# Patient Record
Sex: FEMALE | Race: WHITE | NOT HISPANIC OR LATINO | URBAN - METROPOLITAN AREA
[De-identification: names, ages, dates, MRNs, and addresses within clinical notes are randomized per-mention and may not be internally consistent; named-entity substitution may affect disease eponyms.]

---

## 2018-01-31 ENCOUNTER — IMPORTED ENCOUNTER (OUTPATIENT)
Dept: URBAN - METROPOLITAN AREA CLINIC 9 | Facility: CLINIC | Age: 53
End: 2018-01-31

## 2019-01-16 ENCOUNTER — IMPORTED ENCOUNTER (OUTPATIENT)
Dept: URBAN - METROPOLITAN AREA CLINIC 9 | Facility: CLINIC | Age: 54
End: 2019-01-16

## 2020-01-20 ENCOUNTER — IMPORTED ENCOUNTER (OUTPATIENT)
Dept: URBAN - METROPOLITAN AREA CLINIC 9 | Facility: CLINIC | Age: 55
End: 2020-01-20

## 2020-05-20 NOTE — PATIENT DISCUSSION
GLAUCOMA (SUSPECT) OU :  CD ASYMMETRY. THINNING NOTICED ON ON OCT OU TODAY. RETURN FOR IOP CHECK &amp; REPEAT SCAN IN 3 WEEKS.

## 2020-05-29 NOTE — PATIENT DISCUSSION
GLAUCOMA SUSPECT, OU :  HVF IS WITHIN ACCEPTABLE LIMITS. THICK PACHS. NO DROP THERAPY NEEDED AT THIS TIME.

## 2020-12-14 NOTE — PATIENT DISCUSSION
*Glaucoma Suspect Counseling: I have explained to the patient that they are at a slightly greater risk of glaucoma. Glaucoma potentially causes loss of peripheral vision due to damage to the optic nerve. I have explained that damage to the optic nerve from glaucoma is irreversible and that the available treatments for glaucoma are designed to prevent further damage if we determine glaucoma is present. I have explained the importance of regular follow-up with dilated eye exams to monitor for possible glaucoma.

## 2020-12-14 NOTE — PATIENT DISCUSSION
GLAUCOMA (SUSPECT) OU : CD ASYMMETRY. VERY THICK CORNEAS. DISCUSSED VF AND STABLE RNFL WITH PT. RETURN FOR FOLLOW UP AS SCHEDULED.

## 2021-01-21 ENCOUNTER — IMPORTED ENCOUNTER (OUTPATIENT)
Dept: URBAN - METROPOLITAN AREA CLINIC 9 | Facility: CLINIC | Age: 56
End: 2021-01-21

## 2021-01-21 PROBLEM — H35.3121: Noted: 2021-01-21

## 2021-01-21 PROBLEM — H04.123: Noted: 2021-01-21

## 2021-01-21 PROBLEM — H11.153: Noted: 2021-01-21

## 2021-01-21 PROBLEM — H10.45: Noted: 2021-01-21

## 2021-10-19 ASSESSMENT — KERATOMETRY
OS_K1POWER_DIOPTERS: 44.75
OS_AXISANGLE_DEGREES: 7
OD_K1POWER_DIOPTERS: 44.75
OD_AXISANGLE_DEGREES: 5
OD_K2POWER_DIOPTERS: 45
OD_AXISANGLE2_DEGREES: 95
OS_K1POWER_DIOPTERS: 44
OS_AXISANGLE2_DEGREES: 101
OD_K1POWER_DIOPTERS: 44.25
OS_K2POWER_DIOPTERS: 45.25
OS_AXISANGLE2_DEGREES: 97
OD_AXISANGLE_DEGREES: 180
OS_K2POWER_DIOPTERS: 45
OD_AXISANGLE2_DEGREES: 108
OD_K2POWER_DIOPTERS: 45.25
OS_AXISANGLE_DEGREES: 3
OD_K2POWER_DIOPTERS: 45
OD_K1POWER_DIOPTERS: 44.5
OS_AXISANGLE2_DEGREES: 93
OS_K1POWER_DIOPTERS: 44.5
OS_K2POWER_DIOPTERS: 45.25
OD_K1POWER_DIOPTERS: 44.75
OS_AXISANGLE_DEGREES: 11
OS_K1POWER_DIOPTERS: 44.75
OD_K2POWER_DIOPTERS: 45.25
OS_AXISANGLE_DEGREES: 178
OD_AXISANGLE_DEGREES: 18
OD_AXISANGLE_DEGREES: 8
OS_K2POWER_DIOPTERS: 45
OD_AXISANGLE2_DEGREES: 90
OD_AXISANGLE2_DEGREES: 98
OS_AXISANGLE2_DEGREES: 88

## 2021-10-19 ASSESSMENT — VISUAL ACUITY
OD_CC: 14.2
OS_CC: 20/25 +2 SN
OD_CC: 20/20 SN
OD_CC: 14.2
OS_CC: 14.2
OS_CC: 14.2
OS_CC: 20/20 SN
OS_CC: 20/20 SN
OS_CC: 14.0
OD_CC: 14.0
OD_CC: 20/20 - SN
OD_CC: 20/20 SN
OD_CC: 20/20 SN
OS_CC: 20/20 SN
OD_CC: 20/20 - SN
OS_CC: 14.0
OD_CC: 20/25 -2 SN
OS_CC: 20/20 SN
OD_CC: 14.0
OD_CC: 20/20 SN

## 2021-10-19 ASSESSMENT — TONOMETRY
OD_IOP_MMHG: 14
OS_IOP_MMHG: 12
OD_IOP_MMHG: 13
OD_IOP_MMHG: 13
OS_IOP_MMHG: 12
OS_IOP_MMHG: 13
OD_IOP_MMHG: 12
OS_IOP_MMHG: 14

## 2022-01-31 ENCOUNTER — ESTABLISHED PATIENT (OUTPATIENT)
Dept: URBAN - METROPOLITAN AREA CLINIC 4 | Facility: CLINIC | Age: 57
End: 2022-01-31

## 2022-01-31 DIAGNOSIS — H11.153: ICD-10-CM

## 2022-01-31 DIAGNOSIS — H35.3121: ICD-10-CM

## 2022-01-31 PROCEDURE — 92014 COMPRE OPH EXAM EST PT 1/>: CPT

## 2022-01-31 PROCEDURE — 92310A CONTACT LENS 50

## 2022-01-31 PROCEDURE — 92015 DETERMINE REFRACTIVE STATE: CPT

## 2022-01-31 PROCEDURE — 92134 CPTRZ OPH DX IMG PST SGM RTA: CPT

## 2022-01-31 ASSESSMENT — KERATOMETRY
OD_AXISANGLE_DEGREES: 014
OD_AXISANGLE2_DEGREES: 108
OS_K1POWER_DIOPTERS: 44.75
OS_AXISANGLE_DEGREES: 7
OS_AXISANGLE_DEGREES: 002
OS_K2POWER_DIOPTERS: 45.25
OD_K1POWER_DIOPTERS: 45.00
OD_K2POWER_DIOPTERS: 45
OD_K1POWER_DIOPTERS: 44.75
OD_K2POWER_DIOPTERS: 45.25
OD_AXISANGLE2_DEGREES: 104
OS_AXISANGLE2_DEGREES: 97
OD_AXISANGLE_DEGREES: 18
OS_AXISANGLE2_DEGREES: 92

## 2022-01-31 ASSESSMENT — VISUAL ACUITY
OS_GLARE: 20/40
OD_CC: 20/80
OS_CC: 20/20-1
OD_GLARE: 20/40
OD_CC: J1

## 2022-01-31 ASSESSMENT — TONOMETRY
OD_IOP_MMHG: 15
OS_IOP_MMHG: 15

## 2024-03-11 ENCOUNTER — ESTABLISHED PATIENT (OUTPATIENT)
Facility: LOCATION | Age: 59
End: 2024-03-11

## 2024-03-11 DIAGNOSIS — H35.3121: ICD-10-CM

## 2024-03-11 DIAGNOSIS — H04.123: ICD-10-CM

## 2024-03-11 DIAGNOSIS — H25.13: ICD-10-CM

## 2024-03-11 DIAGNOSIS — H11.153: ICD-10-CM

## 2024-03-11 DIAGNOSIS — H10.45: ICD-10-CM

## 2024-03-11 PROCEDURE — 92014 COMPRE OPH EXAM EST PT 1/>: CPT

## 2024-03-11 PROCEDURE — 92015 DETERMINE REFRACTIVE STATE: CPT

## 2024-03-11 PROCEDURE — 92310A CONTACT LENS 50

## 2024-03-11 ASSESSMENT — VISUAL ACUITY
OD_CC: 20/80
OS_CC: 20/30
OS_GLARE: 20/40
OD_GLARE: 20/40
OD_CC: J7

## 2024-03-11 ASSESSMENT — TONOMETRY
OD_IOP_MMHG: 12
OS_IOP_MMHG: 11

## 2024-04-17 ENCOUNTER — EMERGENCY VISIT (OUTPATIENT)
Facility: LOCATION | Age: 59
End: 2024-04-17

## 2024-04-17 DIAGNOSIS — H11.153: ICD-10-CM

## 2024-04-17 DIAGNOSIS — H04.123: ICD-10-CM

## 2024-04-17 DIAGNOSIS — H16.141: ICD-10-CM

## 2024-04-17 PROCEDURE — 92012 INTRM OPH EXAM EST PATIENT: CPT

## 2024-04-17 RX ORDER — ERYTHROMYCIN 5 MG/G: OINTMENT OPHTHALMIC EVERY EVENING

## 2024-04-17 ASSESSMENT — VISUAL ACUITY
OS_SC: 20/40
OD_SC: 20/40-2

## 2024-04-17 ASSESSMENT — TONOMETRY
OS_IOP_MMHG: 14
OD_IOP_MMHG: 14